# Patient Record
Sex: FEMALE | Race: WHITE
[De-identification: names, ages, dates, MRNs, and addresses within clinical notes are randomized per-mention and may not be internally consistent; named-entity substitution may affect disease eponyms.]

---

## 2019-01-15 ENCOUNTER — HOSPITAL ENCOUNTER (EMERGENCY)
Dept: HOSPITAL 25 - UCEAST | Age: 19
Discharge: HOME | End: 2019-01-15
Payer: COMMERCIAL

## 2019-01-15 DIAGNOSIS — N92.0: Primary | ICD-10-CM

## 2019-01-15 PROCEDURE — 81003 URINALYSIS AUTO W/O SCOPE: CPT

## 2019-01-15 PROCEDURE — 84702 CHORIONIC GONADOTROPIN TEST: CPT

## 2019-01-15 PROCEDURE — 99202 OFFICE O/P NEW SF 15 MIN: CPT

## 2019-01-15 PROCEDURE — 76830 TRANSVAGINAL US NON-OB: CPT

## 2019-01-15 PROCEDURE — 96372 THER/PROPH/DIAG INJ SC/IM: CPT

## 2019-01-15 PROCEDURE — G0463 HOSPITAL OUTPT CLINIC VISIT: HCPCS

## 2019-01-15 NOTE — UC
Abdominal Pain Female HPI





- HPI Summary


HPI Summary: 





19 yo female presents with lower abdominal cramping and menstrual bleeding. She 

tells me that she has been on the same OBC for the last 3 years and has had no 

issues. She gets menstrual bleeding every month, but usually does not have much 

discomfort. Early this morning she was awakened by severe lower abdominal 

discomfort accompanied by the beginnings of her menstrual cycle. She has never 

had pain this bad before. She took some tylenol and ibuprofen, which helped a 

little. She denies fever, chills, n/v/d/c, dysuria, flank pain. Last sexually 

active about 3-4 weeks ago and denies abnormal vaginal discharge. No concern 

for STIs today. 








- History of Current Complaint


Stated Complaint: ABD PAIN


Time Seen by Provider: 01/15/19 12:04


Hx Obtained From: Patient


Onset/Duration: Sudden Onset


Severity Initially: Severe


Severity Currently: Moderate


Pain Intensity: 8


Pain Scale Used: 0-10 Numeric


Allergies/Adverse Reactions: 


 Allergies











Allergy/AdvReac Type Severity Reaction Status Date / Time


 


No Known Allergies Allergy   Verified 01/15/19 12:04











Home Medications: 


 Home Medications





Acetaminophen [Tylenol] 650 mg PO ONCE PRN 01/15/19 [History Confirmed 01/15/19]


Ibuprofen [Advil] 400 mg PO ONCE PRN 01/15/19 [History Confirmed 01/15/19]











PMH/Surg Hx/FS Hx/Imm Hx





- Additional Past Medical History


Additional PMH: 





None





- Surgical History


Surgical History: None





- Family History


Known Family History: Positive: None





- Social History


Occupation: Student


Lives: With Family


Alcohol Use: None


Substance Use Type: None


Smoking Status (MU): Never Smoked Tobacco





Review of Systems


All Other Systems Reviewed And Are Negative: Yes


Constitutional: Positive: Negative


Skin: Positive: Negative


Respiratory: Positive: Negative


Cardiovascular: Positive: Negative


Gastrointestinal: Positive: Abdominal Pain


Genitourinary: Positive: Negative


Motor: Positive: Negative


Neurovascular: Positive: Negative


Neurological: Positive: Negative


Psychological: Positive: Negative





Physical Exam





- Summary


Physical Exam Summary: 





GENERAL: NAD. WDWN. No pain distress.


SKIN: No rashes, sores, lesions, or open wounds.


NECK: Supple. Nontender. No lymphadenopathy. 


CHEST:  CTAB. No r/r/w. No accessory muscle use. Breathing comfortably and in 

no distress.


CV:  RRR. Without m/r/g. Pulses intact. Cap refill <2seconds


ABDOMEN:  Soft. Mild TTP along lower abdomen without specific point tenderness. 

No rebound. No CVA tenderness. Bowel sounds present


NEURO: Alert. 


PSYCH: Age appropriate behavior.


Triage Information Reviewed: Yes


Vital Signs: 





Vital Signs:











Temp Pulse Resp BP Pulse Ox


 


 98.3 F   76   18   116/71   100 


 


 01/15/19 11:59  01/15/19 11:59  01/15/19 11:59  01/15/19 11:59  01/15/19 11:59








 Laboratory Tests











  01/15/19 01/15/19





  12:40 12:43


 


POC Urine Color  Yellow 


 


POC Urine Clarity  Clear 


 


POC Urine pH  7.0 


 


POC Ur Specif Gravity  1.020 


 


POC Urine Protein  Negative 


 


POC Ur Glucose (UA)  Negative 


 


POC Urine Ketones  Negative 


 


POC Urine Blood  3+ A 


 


POC Urine Nitrite  Negative 


 


POC Urine Bilirubin  Negative 


 


POC Urine Urobilinogen  0.2 


 


POC U Leukocyte Esteras  Negative 


 


POC Ur Pregnancy Test   Negative











Vital Signs Reviewed: Yes





Abd Pain Female Course/Dx





- Course


Course Of Treatment: Declined pelvic exam. US: IMPRESSION:  1. THERE IS A SMALL 

AMOUNT OF SIMPLE FLUID WITHIN THE CUL-DE-SAC. THIS MAY BE PHYSIOLOGIC.  IN A 

REPRODUCTIVE AGE FEMALE.  2. NO SONOGRAPHIC FEATURES OF TORSION. PLEASE NOTE 

THAT PARTIAL OR INTERMITTENT TORSION.  MAY BE SONOGRAPHICALLY NORMAL.  She was 

given toradol IM in the clinic with great relief of her discomfort. Suspect 

menorrhalgia. Advised to apply a warm heating pad to her lower abdomen and will 

rx for naproxen. Advised to f/u with OBGYN if her symptoms do not improve.





- Differential Dx/Diagnosis


Provider Diagnosis: 


 Menorrhalgia








Discharge





- Sign-Out/Discharge


Documenting (check all that apply): Patient Departure


All imaging exams completed and their final reports reviewed: Yes





- Discharge Plan


Condition: Stable


Disposition: HOME


Prescriptions: 


Naproxen TAB* [Naprosyn 250 mg TAB*] 500 mg PO BID PRN #30 tab


 PRN Reason: Pain


Patient Education Materials:  Dysmenorrhea (ED)


Referrals: 


No Primary Care Phys,NOPCP [Primary Care Provider] - 


Willy Mccann MD [Medical Doctor] - If Needed


Additional Instructions: 


If you develop a fever, shortness of breath, chest pain, new or worsening 

symptoms - please call your PCP or go to the ED.


 


If you continue to have GYN related discomfort/bleeding - please follow up with 

OBGYN at the number below





- Billing Disposition and Condition


Condition: STABLE


Disposition: Home